# Patient Record
Sex: FEMALE | Race: WHITE | NOT HISPANIC OR LATINO | Employment: PART TIME | URBAN - METROPOLITAN AREA
[De-identification: names, ages, dates, MRNs, and addresses within clinical notes are randomized per-mention and may not be internally consistent; named-entity substitution may affect disease eponyms.]

---

## 2024-10-25 ENCOUNTER — LAB (OUTPATIENT)
Dept: FAMILY MEDICINE CLINIC | Facility: CLINIC | Age: 34
End: 2024-10-25
Payer: COMMERCIAL

## 2024-10-25 ENCOUNTER — OFFICE VISIT (OUTPATIENT)
Dept: FAMILY MEDICINE CLINIC | Facility: CLINIC | Age: 34
End: 2024-10-25
Payer: COMMERCIAL

## 2024-10-25 VITALS
BODY MASS INDEX: 24.1 KG/M2 | HEART RATE: 62 BPM | DIASTOLIC BLOOD PRESSURE: 80 MMHG | SYSTOLIC BLOOD PRESSURE: 110 MMHG | HEIGHT: 69 IN | WEIGHT: 162.7 LBS | RESPIRATION RATE: 18 BRPM | TEMPERATURE: 97.7 F | OXYGEN SATURATION: 98 %

## 2024-10-25 DIAGNOSIS — Z76.89 ENCOUNTER TO ESTABLISH CARE: ICD-10-CM

## 2024-10-25 DIAGNOSIS — Z00.00 ANNUAL PHYSICAL EXAM: ICD-10-CM

## 2024-10-25 DIAGNOSIS — Z00.00 ANNUAL PHYSICAL EXAM: Primary | ICD-10-CM

## 2024-10-25 DIAGNOSIS — Z23 NEED FOR DIPHTHERIA-TETANUS-PERTUSSIS (TDAP) VACCINE: ICD-10-CM

## 2024-10-25 LAB
ALBUMIN SERPL-MCNC: 3.7 G/DL (ref 3.5–5.2)
ALBUMIN/GLOB SERPL: 1.1 G/DL
ALP SERPL-CCNC: 39 U/L (ref 39–117)
ALT SERPL W P-5'-P-CCNC: 9 U/L (ref 1–33)
ANION GAP SERPL CALCULATED.3IONS-SCNC: 5.4 MMOL/L (ref 5–15)
AST SERPL-CCNC: 13 U/L (ref 1–32)
BASOPHILS # BLD AUTO: 0.02 10*3/MM3 (ref 0–0.2)
BASOPHILS NFR BLD AUTO: 0.5 % (ref 0–1.5)
BILIRUB SERPL-MCNC: 0.4 MG/DL (ref 0–1.2)
BILIRUB UR QL STRIP: NEGATIVE
BUN SERPL-MCNC: 7 MG/DL (ref 6–20)
BUN/CREAT SERPL: 7.3 (ref 7–25)
CALCIUM SPEC-SCNC: 8.9 MG/DL (ref 8.6–10.5)
CHLORIDE SERPL-SCNC: 106 MMOL/L (ref 98–107)
CHOLEST SERPL-MCNC: 127 MG/DL (ref 0–200)
CLARITY UR: CLEAR
CO2 SERPL-SCNC: 27.6 MMOL/L (ref 22–29)
COLOR UR: YELLOW
CREAT SERPL-MCNC: 0.96 MG/DL (ref 0.57–1)
DEPRECATED RDW RBC AUTO: 43.6 FL (ref 37–54)
EGFRCR SERPLBLD CKD-EPI 2021: 80.3 ML/MIN/1.73
EOSINOPHIL # BLD AUTO: 0.12 10*3/MM3 (ref 0–0.4)
EOSINOPHIL NFR BLD AUTO: 3 % (ref 0.3–6.2)
ERYTHROCYTE [DISTWIDTH] IN BLOOD BY AUTOMATED COUNT: 11.9 % (ref 12.3–15.4)
GLOBULIN UR ELPH-MCNC: 3.3 GM/DL
GLUCOSE SERPL-MCNC: 93 MG/DL (ref 65–99)
GLUCOSE UR STRIP-MCNC: NEGATIVE MG/DL
HBA1C MFR BLD: 4.8 % (ref 4.8–5.6)
HCT VFR BLD AUTO: 40.8 % (ref 34–46.6)
HDLC SERPL-MCNC: 46 MG/DL (ref 40–60)
HGB BLD-MCNC: 14.1 G/DL (ref 12–15.9)
HGB UR QL STRIP.AUTO: NEGATIVE
HOLD SPECIMEN: NORMAL
IMM GRANULOCYTES # BLD AUTO: 0.01 10*3/MM3 (ref 0–0.05)
IMM GRANULOCYTES NFR BLD AUTO: 0.3 % (ref 0–0.5)
KETONES UR QL STRIP: NEGATIVE
LDLC SERPL CALC-MCNC: 66 MG/DL (ref 0–100)
LDLC/HDLC SERPL: 1.43 {RATIO}
LEUKOCYTE ESTERASE UR QL STRIP.AUTO: NEGATIVE
LYMPHOCYTES # BLD AUTO: 1.05 10*3/MM3 (ref 0.7–3.1)
LYMPHOCYTES NFR BLD AUTO: 26.5 % (ref 19.6–45.3)
MCH RBC QN AUTO: 34.3 PG (ref 26.6–33)
MCHC RBC AUTO-ENTMCNC: 34.6 G/DL (ref 31.5–35.7)
MCV RBC AUTO: 99.3 FL (ref 79–97)
MONOCYTES # BLD AUTO: 0.33 10*3/MM3 (ref 0.1–0.9)
MONOCYTES NFR BLD AUTO: 8.3 % (ref 5–12)
NEUTROPHILS NFR BLD AUTO: 2.43 10*3/MM3 (ref 1.7–7)
NEUTROPHILS NFR BLD AUTO: 61.4 % (ref 42.7–76)
NITRITE UR QL STRIP: NEGATIVE
NRBC BLD AUTO-RTO: 0 /100 WBC (ref 0–0.2)
PH UR STRIP.AUTO: 6.5 [PH] (ref 5–8)
PLATELET # BLD AUTO: 130 10*3/MM3 (ref 140–450)
PMV BLD AUTO: 11.8 FL (ref 6–12)
POTASSIUM SERPL-SCNC: 4.6 MMOL/L (ref 3.5–5.2)
PROT SERPL-MCNC: 7 G/DL (ref 6–8.5)
PROT UR QL STRIP: NEGATIVE
RBC # BLD AUTO: 4.11 10*6/MM3 (ref 3.77–5.28)
SODIUM SERPL-SCNC: 139 MMOL/L (ref 136–145)
SP GR UR STRIP: 1.02 (ref 1–1.03)
TRIGL SERPL-MCNC: 77 MG/DL (ref 0–150)
TSH SERPL DL<=0.05 MIU/L-ACNC: 1.25 UIU/ML (ref 0.27–4.2)
UROBILINOGEN UR QL STRIP: NORMAL
VLDLC SERPL-MCNC: 15 MG/DL (ref 5–40)
WBC NRBC COR # BLD AUTO: 3.96 10*3/MM3 (ref 3.4–10.8)

## 2024-10-25 PROCEDURE — 81003 URINALYSIS AUTO W/O SCOPE: CPT | Performed by: NURSE PRACTITIONER

## 2024-10-25 PROCEDURE — 80050 GENERAL HEALTH PANEL: CPT | Performed by: NURSE PRACTITIONER

## 2024-10-25 PROCEDURE — 83036 HEMOGLOBIN GLYCOSYLATED A1C: CPT | Performed by: NURSE PRACTITIONER

## 2024-10-25 PROCEDURE — 80061 LIPID PANEL: CPT | Performed by: NURSE PRACTITIONER

## 2024-10-25 PROCEDURE — 99385 PREV VISIT NEW AGE 18-39: CPT | Performed by: NURSE PRACTITIONER

## 2024-10-25 NOTE — PROGRESS NOTES
Chief Complaint  Establish Care    Subjective        Chelle Casillas presents to [facility]  History of Present Illness    Patient presents today to establish care and for annual physical exam.  Last PCP with Dr. Mckinley.  Currently works as  at [employer].  , daughter age 13. Enjoys dogs and  volleyball games.  Follows with gynecology; [facility]- last Pap normal August 2024.  Also follows with [facility] in [place].       Review of systems:   Constitutional: Negative for fatigue, fever, chills, weight change, night sweats, and trouble sleeping    Skin: Negative for itching, rash, growth/lesions, dryness, change in hair or nails, lumps, abnormal mole, color change, and moisture    Eyes: Negative for visual disturbance and dry eyes    HEENT: Hx canker sores. Negative for hoarseness, nosebleeds, post nasal drainage, snoring, sneezing, vertigo, sore throat, dry mouth, dental/oral cavity problem, nasal discharge, sinus pain, earache, tinnitus, vertigo, and difficult hearing    Respiratory: Negative for shortness of breath, apnea, cough, sputum, and wheezing. Vaping daily.     Breast: Family hx aunt with breast cancer. Negative for lumps or nipple discharge    Cardiovascular: Negative for chest pain, palpitations, dizziness, fainting, and edema    Gastrointestinal: Negative for nausea, vomiting, heartburn, change in appetite, swallowing problem, abdominal pain, constipation, diarrhea, black, blood, or mucous in stool, and change in stool caliber    Genitourinary: Negative for frequency, urgency, hesitancy, dysuria, incontinence, hematuria, hernia, vaginal discharge, vaginal dryness, itching, abnormal bleeding, dyspareunia, and pelvic pain     Musculoskeletal: Negative for loss of strength, restricted movement, joint pain, joint swelling, and muscle pain    Neurological: Negative for headache, dizziness, numbness, weakness, tingling/paresthesia, memory  "change/loss, loss of consciousness, speech disturbance, tremor, unsteady gait, and restless legs    Hematologic/Lymphatic/Immunologic: Hx easy bruising. Negative for easy bleeding, lymph node enlargement, and recurrent infection     Endocrine: Negative for polyphagia, polydipsia, polyuria, cold intolerance, heat intolerance, weight change, and excessive sweating    Psychiatric: Negative for anxiety, depression, and attention problems       PHQ-9 Depression Screening  Little interest or pleasure in doing things? Not at all   Feeling down, depressed, or hopeless? Not at all   PHQ-2 Total Score 0   Trouble falling or staying asleep, or sleeping too much?     Feeling tired or having little energy?     Poor appetite or overeating?     Feeling bad about yourself - or that you are a failure or have let yourself or your family down?     Trouble concentrating on things, such as reading the newspaper or watching television?     Moving or speaking so slowly that other people could have noticed? Or the opposite - being so fidgety or restless that you have been moving around a lot more than usual?     Thoughts that you would be better off dead, or of hurting yourself in some way?     PHQ-9 Total Score     If you checked off any problems, how difficult have these problems made it for you to do your work, take care of things at home, or get along with other people?              Objective   Vital Signs:  /80 (BP Location: Left arm, Patient Position: Sitting, Cuff Size: Adult)   Pulse 62   Temp 97.7 °F (36.5 °C) (Temporal)   Resp 18   Ht 175.3 cm (69\")   Wt 73.8 kg (162 lb 11.2 oz)   SpO2 98%   BMI 24.03 kg/m²   Estimated body mass index is 24.03 kg/m² as calculated from the following:    Height as of this encounter: 175.3 cm (69\").    Weight as of this encounter: 73.8 kg (162 lb 11.2 oz).    BMI is within normal parameters. No other follow-up for BMI required.      Physical Exam  Vitals and nursing note reviewed. "   Constitutional:       General: She is not in acute distress.     Appearance: She is well-developed, well-groomed and normal weight.      Comments: Pleasant, converses appropriately     HENT:      Head: Normocephalic and atraumatic.      Right Ear: Hearing, tympanic membrane, ear canal and external ear normal.      Left Ear: Hearing, tympanic membrane, ear canal and external ear normal.      Nose: Nose normal.      Mouth/Throat:      Lips: Pink.      Mouth: Mucous membranes are moist.      Pharynx: Oropharynx is clear.   Eyes:      Conjunctiva/sclera: Conjunctivae normal.      Pupils: Pupils are equal, round, and reactive to light.   Neck:      Thyroid: No thyromegaly.   Cardiovascular:      Rate and Rhythm: Normal rate and regular rhythm.      Chest Wall: PMI is not displaced.      Pulses: Normal pulses.      Heart sounds: Normal heart sounds, S1 normal and S2 normal.   Pulmonary:      Effort: Pulmonary effort is normal.      Breath sounds: Normal breath sounds.   Abdominal:      General: Bowel sounds are normal.      Palpations: Abdomen is soft. There is no hepatomegaly or splenomegaly.      Tenderness: There is no abdominal tenderness.   Musculoskeletal:         General: Normal range of motion.      Cervical back: Normal range of motion and neck supple.      Right lower leg: No edema.      Left lower leg: No edema.      Comments: Normal strength bilateral upper and lower extremities.   Lymphadenopathy:      Cervical: No cervical adenopathy.      Upper Body:      Right upper body: No supraclavicular adenopathy.      Left upper body: No supraclavicular adenopathy.   Skin:     General: Skin is warm and dry.      Findings: No rash.   Neurological:      Mental Status: She is alert and oriented to person, place, and time.      Cranial Nerves: Cranial nerves 2-12 are intact.      Motor: Motor function is intact.      Gait: Gait is intact.   Psychiatric:         Attention and Perception: Attention normal.         Mood  and Affect: Mood and affect normal.         Speech: Speech normal.         Behavior: Behavior normal.         Thought Content: Thought content normal.         Judgment: Judgment normal.      Comments: Dressed appropriately.         Result Review :                Assessment and Plan   Diagnoses and all orders for this visit:    1. Annual physical exam (Primary)  Assessment & Plan:  Discussed injury prevention, diet and exercise, safe sexual practices, and screening for common diseases. Encouraged use of sunscreen and seatbelts. Discussed timing of breast cancer screening, cervical cancer screening, colon cancer screening, and review of skin for lesions. Avoidance of tobacco encouraged. Limitation or avoidance of alcohol encouraged. Recommend yearly eye and dental exams. Also discussed monitoring of blood pressure and lipids.       Orders:  -     CBC & Differential; Future  -     Comprehensive Metabolic Panel; Future  -     Hemoglobin A1c; Future  -     Urinalysis With Culture If Indicated -; Future  -     TSH Rfx On Abnormal To Free T4; Future  -     Lipid Panel; Future    2. Encounter to establish care  -     CBC & Differential; Future  -     Comprehensive Metabolic Panel; Future  -     Hemoglobin A1c; Future  -     Urinalysis With Culture If Indicated -; Future  -     TSH Rfx On Abnormal To Free T4; Future  -     Lipid Panel; Future    3. Need for diphtheria-tetanus-pertussis (Tdap) vaccine  Comments:  Education provided at checkout.             Follow Up   Return in about 1 year (around 10/25/2025) for Annual physical.  Patient was given instructions and counseling regarding her condition or for health maintenance advice. Please see specific information pulled into the AVS if appropriate.

## 2024-10-25 NOTE — ASSESSMENT & PLAN NOTE
Discussed injury prevention, diet and exercise, safe sexual practices, and screening for common diseases. Encouraged use of sunscreen and seatbelts. Discussed timing of breast cancer screening, cervical cancer screening, colon cancer screening, and review of skin for lesions. Avoidance of tobacco encouraged. Limitation or avoidance of alcohol encouraged. Recommend yearly eye and dental exams. Also discussed monitoring of blood pressure and lipids.

## 2024-10-26 DIAGNOSIS — R71.8 ELEVATED MCV: Primary | ICD-10-CM

## 2024-10-31 ENCOUNTER — LAB (OUTPATIENT)
Dept: FAMILY MEDICINE CLINIC | Facility: CLINIC | Age: 34
End: 2024-10-31
Payer: COMMERCIAL

## 2024-10-31 ENCOUNTER — OFFICE VISIT (OUTPATIENT)
Dept: FAMILY MEDICINE CLINIC | Facility: CLINIC | Age: 34
End: 2024-10-31
Payer: COMMERCIAL

## 2024-10-31 VITALS
DIASTOLIC BLOOD PRESSURE: 60 MMHG | BODY MASS INDEX: 23.98 KG/M2 | OXYGEN SATURATION: 97 % | RESPIRATION RATE: 18 BRPM | HEART RATE: 68 BPM | TEMPERATURE: 97.7 F | HEIGHT: 69 IN | SYSTOLIC BLOOD PRESSURE: 106 MMHG | WEIGHT: 161.9 LBS

## 2024-10-31 DIAGNOSIS — R71.8 ELEVATED MCV: ICD-10-CM

## 2024-10-31 DIAGNOSIS — J02.9 PHARYNGITIS, UNSPECIFIED ETIOLOGY: ICD-10-CM

## 2024-10-31 DIAGNOSIS — B37.0 ORAL THRUSH: ICD-10-CM

## 2024-10-31 DIAGNOSIS — K13.70 MOUTH PROBLEM: Primary | ICD-10-CM

## 2024-10-31 PROCEDURE — 82607 VITAMIN B-12: CPT | Performed by: NURSE PRACTITIONER

## 2024-10-31 PROCEDURE — 87205 SMEAR GRAM STAIN: CPT | Performed by: NURSE PRACTITIONER

## 2024-10-31 PROCEDURE — 99213 OFFICE O/P EST LOW 20 MIN: CPT | Performed by: NURSE PRACTITIONER

## 2024-10-31 PROCEDURE — 82746 ASSAY OF FOLIC ACID SERUM: CPT | Performed by: NURSE PRACTITIONER

## 2024-10-31 PROCEDURE — 87070 CULTURE OTHR SPECIMN AEROBIC: CPT | Performed by: NURSE PRACTITIONER

## 2024-10-31 RX ORDER — NYSTATIN 100000 [USP'U]/ML
500000 SUSPENSION ORAL 4 TIMES DAILY
Qty: 120 ML | Refills: 0 | Status: SHIPPED | OUTPATIENT
Start: 2024-10-31 | End: 2024-11-03 | Stop reason: SDUPTHER

## 2024-10-31 NOTE — PROGRESS NOTES
"Chief Complaint  sores in mouth (Started on Friday, with a weird taste, then the sores started on Monday. This is the 4th time this has happened. Her dentist said for her to come here. )    Subjective        Chelle Casillas presents to [facility]  History of Present Illness    Mouth lesion/sore:   Onset began 7 days ago. Severity is moderate. Status is worse. Frequency is constant. Location is under tongue. Quality is white and red patches. Context includes positive history of 4 other similar episodes.   Negative for cheek biting, chewing tobacco use, excessive alcohol use, inhaled corticosteroid use, oral corticosteroid use, orthodontic appliance, recent antibiotic use, trauma, viral illness. Aggravated by acidic foods. Relieved by Magic mouthwash in the past.  Associated symptoms include intermittent sore throat, poor dietary intake, metal taste and pain.  Pertinent negatives include ammonia like odor, bleeding from the site, bruxism, dysphagia, erythema of lips, fever, food allergies, malaise, odynophagia, otalgia, prodromal sensation, diminished salivation, profuse salivation, skin disorder, strawberry tongue.  Comments: Needs B12 and folate labs completed following initial visit.        Objective   Vital Signs:  /60 (BP Location: Left arm, Patient Position: Sitting, Cuff Size: Adult)   Pulse 68   Temp 97.7 °F (36.5 °C) (Temporal)   Resp 18   Ht 175.3 cm (69\")   Wt 73.4 kg (161 lb 14.4 oz)   SpO2 97%   BMI 23.91 kg/m²   Estimated body mass index is 23.91 kg/m² as calculated from the following:    Height as of this encounter: 175.3 cm (69\").    Weight as of this encounter: 73.4 kg (161 lb 14.4 oz).    BMI is within normal parameters. No other follow-up for BMI required.      Physical Exam  Constitutional:       General: She is not in acute distress.  HENT:      Head: Normocephalic and atraumatic.      Right Ear: Tympanic membrane, ear canal and external ear normal. "      Left Ear: Tympanic membrane, ear canal and external ear normal.      Nose: Nose normal. No congestion or rhinorrhea.      Right Sinus: No maxillary sinus tenderness or frontal sinus tenderness.      Left Sinus: No maxillary sinus tenderness or frontal sinus tenderness.      Mouth/Throat:      Mouth: Mucous membranes are moist.      Tongue: No lesions.      Comments: Few faint areas of gingival swelling.  Mild pharyngeal erythema.  White patches under the tongue.  Eyes:      Conjunctiva/sclera: Conjunctivae normal.   Cardiovascular:      Rate and Rhythm: Normal rate and regular rhythm.      Chest Wall: PMI is not displaced.      Heart sounds: Normal heart sounds, S1 normal and S2 normal.   Pulmonary:      Effort: Pulmonary effort is normal.      Breath sounds: Normal breath sounds.      Comments: Cough absent.   Musculoskeletal:      Cervical back: Neck supple.   Lymphadenopathy:      Cervical: No cervical adenopathy.   Skin:     General: Skin is warm and dry.      Findings: No rash.   Neurological:      Mental Status: She is alert and oriented to person, place, and time.      Gait: Gait is intact.   Psychiatric:         Mood and Affect: Mood normal.         Thought Content: Thought content normal.        Result Review :    CMP          10/25/2024    11:04   CMP   Glucose 93    BUN 7    Creatinine 0.96    EGFR 80.3    Sodium 139    Potassium 4.6    Chloride 106    Calcium 8.9    Total Protein 7.0    Albumin 3.7    Globulin 3.3    Total Bilirubin 0.4    Alkaline Phosphatase 39    AST (SGOT) 13    ALT (SGPT) 9    Albumin/Globulin Ratio 1.1    BUN/Creatinine Ratio 7.3    Anion Gap 5.4      CBC w/diff          10/25/2024    11:04   CBC w/Diff   WBC 3.96    RBC 4.11    Hemoglobin 14.1    Hematocrit 40.8    MCV 99.3    MCH 34.3    MCHC 34.6    RDW 11.9    Platelets 130    Neutrophil Rel % 61.4    Immature Granulocyte Rel % 0.3    Lymphocyte Rel % 26.5    Monocyte Rel % 8.3    Eosinophil Rel % 3.0    Basophil Rel % 0.5       Lipid Panel          10/25/2024    11:04   Lipid Panel   Total Cholesterol 127    Triglycerides 77    HDL Cholesterol 46    VLDL Cholesterol 15    LDL Cholesterol  66    LDL/HDL Ratio 1.43      TSH          10/25/2024    11:04   TSH   TSH 1.250      Most Recent A1C          10/25/2024    11:04   HGBA1C Most Recent   Hemoglobin A1C 4.80                Assessment and Plan   Diagnoses and all orders for this visit:    1. Mouth problem (Primary)  -     nystatin (MYCOSTATIN) 100,000 unit/mL suspension; Swish and swallow 5 mL 4 (Four) Times a Day.  Dispense: 120 mL; Refill: 0  -     Throat / Upper Respiratory Culture - Swab, Throat    2. Oral thrush  -     nystatin (MYCOSTATIN) 100,000 unit/mL suspension; Swish and swallow 5 mL 4 (Four) Times a Day.  Dispense: 120 mL; Refill: 0    3. Pharyngitis, unspecified etiology  -     Throat / Upper Respiratory Culture - Swab, Throat    Treat for thrush.  Will send throat culture considering past episodes.  Complete follow-up labs today.         Follow Up   Return if symptoms worsen or fail to improve.  Patient was given instructions and counseling regarding her condition or for health maintenance advice. Please see specific information pulled into the AVS if appropriate.             Answers submitted by the patient for this visit:  Other (Submitted on 10/30/2024)  Please describe your symptoms.: Mouth sores and white oatches under tongue. Slight sore throat.  Have you had these symptoms before?: Yes  How long have you been having these symptoms?: 1-2 weeks  Primary Reason for Visit (Submitted on 10/30/2024)  What is the primary reason for your visit?: Problem Not Listed

## 2024-11-01 LAB
FOLATE SERPL-MCNC: 8.99 NG/ML (ref 4.78–24.2)
VIT B12 BLD-MCNC: 484 PG/ML (ref 211–946)

## 2024-11-02 LAB
BACTERIA SPEC AEROBE CULT: NORMAL
GRAM STN SPEC: NORMAL

## 2024-11-03 DIAGNOSIS — B37.0 ORAL THRUSH: ICD-10-CM

## 2024-11-03 DIAGNOSIS — K13.70 MOUTH PROBLEM: ICD-10-CM

## 2024-11-03 RX ORDER — NYSTATIN 100000 [USP'U]/ML
500000 SUSPENSION ORAL 4 TIMES DAILY
Qty: 120 ML | Refills: 0 | Status: SHIPPED | OUTPATIENT
Start: 2024-11-03

## 2024-11-11 DIAGNOSIS — K13.70 MOUTH PROBLEM: Primary | ICD-10-CM

## 2024-11-11 RX ORDER — ACYCLOVIR 400 MG/1
400 TABLET ORAL 3 TIMES DAILY
Qty: 30 TABLET | Refills: 0 | Status: SHIPPED | OUTPATIENT
Start: 2024-11-11

## 2025-01-02 ENCOUNTER — OFFICE VISIT (OUTPATIENT)
Dept: FAMILY MEDICINE CLINIC | Facility: CLINIC | Age: 35
End: 2025-01-02
Payer: COMMERCIAL

## 2025-01-02 VITALS
BODY MASS INDEX: 24.14 KG/M2 | DIASTOLIC BLOOD PRESSURE: 65 MMHG | TEMPERATURE: 97.9 F | SYSTOLIC BLOOD PRESSURE: 96 MMHG | RESPIRATION RATE: 18 BRPM | OXYGEN SATURATION: 96 % | HEART RATE: 87 BPM | WEIGHT: 163 LBS | HEIGHT: 69 IN

## 2025-01-02 DIAGNOSIS — R05.9 COUGH IN ADULT: ICD-10-CM

## 2025-01-02 DIAGNOSIS — J02.9 SORE THROAT: Primary | ICD-10-CM

## 2025-01-02 DIAGNOSIS — J10.1 INFLUENZA A: ICD-10-CM

## 2025-01-02 LAB
EXPIRATION DATE: ABNORMAL
EXPIRATION DATE: NORMAL
FLUAV AG UPPER RESP QL IA.RAPID: DETECTED
FLUBV AG UPPER RESP QL IA.RAPID: NOT DETECTED
INTERNAL CONTROL: ABNORMAL
INTERNAL CONTROL: NORMAL
Lab: ABNORMAL
Lab: NORMAL
S PYO AG THROAT QL: NEGATIVE
SARS-COV-2 AG UPPER RESP QL IA.RAPID: NOT DETECTED

## 2025-01-02 PROCEDURE — 87880 STREP A ASSAY W/OPTIC: CPT | Performed by: FAMILY MEDICINE

## 2025-01-02 PROCEDURE — 99214 OFFICE O/P EST MOD 30 MIN: CPT | Performed by: FAMILY MEDICINE

## 2025-01-02 PROCEDURE — 87428 SARSCOV & INF VIR A&B AG IA: CPT | Performed by: FAMILY MEDICINE

## 2025-01-02 RX ORDER — OSELTAMIVIR PHOSPHATE 75 MG/1
75 CAPSULE ORAL 2 TIMES DAILY
Qty: 10 CAPSULE | Refills: 0 | Status: SHIPPED | OUTPATIENT
Start: 2025-01-02 | End: 2025-01-07

## 2025-01-02 RX ORDER — BENZONATATE 200 MG/1
200 CAPSULE ORAL 3 TIMES DAILY PRN
Qty: 20 CAPSULE | Refills: 0 | Status: SHIPPED | OUTPATIENT
Start: 2025-01-02

## 2025-01-02 NOTE — PROGRESS NOTES
Subjective   Chelle Casillas is a 34 y.o. female.     History of Present Illness     History of Present Illness  The patient is a 34-year-old female who presents with complaints of sore throat, cough, and congestion. Her symptoms began on [place] day, initially presenting as mild drainage and a scratchy throat. Over the past 3 days, her condition has worsened, with severe throat pain reported on Monday. By Wednesday morning, she developed a fever of 102.1 degrees and a dry cough, which has since evolved into a productive cough. Despite taking Mucinex, she has been unable to expectorate. She has been experiencing fatigue, sleeping more, and decreased appetite.  she has been managing her fever with Tylenol and ibuprofen. This morning, she had a fever. She also reports shortness of breath, attributing these symptoms to nasal and throat congestion. She reports no dizziness, stomach pain, nausea, vomiting, or diarrhea. She has been maintaining hydration.      The following portions of the patient's history were reviewed and updated as appropriate: past medical history, past social history, past surgical history and problem list.    Review of Systems   Constitutional:  Positive for activity change, appetite change, fatigue and fever.   HENT:  Positive for congestion, postnasal drip, sinus pressure, sore throat and swollen glands. Negative for ear pain.    Respiratory:  Positive for cough and shortness of breath. Negative for wheezing.    Cardiovascular:  Negative for chest pain.   Gastrointestinal:  Negative for abdominal pain, nausea and vomiting.   Neurological:  Negative for headache.       Results  Laboratory Studies  Positive for influenza A.  Negative for strep and COVID-19 test.    Objective   Physical Exam  Vitals reviewed.   Constitutional:       General: She is not in acute distress.     Appearance: She is well-developed.   HENT:      Right Ear: Tympanic membrane, ear canal and external ear normal.      Left  Ear: Tympanic membrane, ear canal and external ear normal.      Nose: Congestion and rhinorrhea present.      Mouth/Throat:      Pharynx: Posterior oropharyngeal erythema present.   Eyes:      Conjunctiva/sclera: Conjunctivae normal.   Neck:      Thyroid: No thyromegaly.   Cardiovascular:      Heart sounds: Normal heart sounds.   Pulmonary:      Effort: Pulmonary effort is normal.      Breath sounds: Normal breath sounds. No wheezing.   Abdominal:      Tenderness: There is no abdominal tenderness.   Neurological:      Mental Status: She is alert and oriented to person, place, and time.         Vitals:    01/02/25 1048   BP: 96/65   Pulse: 87   Resp: 18   Temp: 97.9 °F (36.6 °C)   SpO2: 96%     Current Outpatient Medications on File Prior to Visit   Medication Sig Dispense Refill    acyclovir (ZOVIRAX) 400 MG tablet Take 1 tablet by mouth 3 (Three) Times a Day. 30 tablet 0    nystatin (MYCOSTATIN) 100,000 unit/mL suspension Swish and swallow 5 mL 4 (Four) Times a Day. 120 mL 0     No current facility-administered medications on file prior to visit.         Assessment & Plan   Problems Addressed this Visit       Sore throat - Primary    Relevant Orders    POCT SARS-CoV-2 + Flu Antigen SHRUTI (Completed)    POC Rapid Strep A (Completed)    Cough in adult    Relevant Medications    benzonatate (TESSALON) 200 MG capsule    Other Relevant Orders    POCT SARS-CoV-2 + Flu Antigen SHRUTI (Completed)    POC Rapid Strep A (Completed)    Influenza A    Relevant Medications    oseltamivir (Tamiflu) 75 MG capsule     Diagnoses         Codes Comments    Sore throat    -  Primary ICD-10-CM: J02.9  ICD-9-CM: 462     Cough in adult     ICD-10-CM: R05.9  ICD-9-CM: 786.2     Influenza A     ICD-10-CM: J10.1  ICD-9-CM: 487.1             Assessment & Plan  1. Influenza.  She tested positive for influenza. Her symptoms include sore throat, cough, congestion, fever, and fatigue.  A prescription for Tamiflu 75 mg, to be taken twice daily for 5  days, has been sent to her [facility] pharmacy. She is advised to stay hydrated and monitor her symptoms. A work note has been provided, excusing her from work today and tomorrow. She may return to work on Saturday if she is fever-free.    2. Cough.  A prescription for Tessalon Perles, to be taken every 8 hours as needed for the cough.  Encourage fluids.    3.Sore throat.  Discussed symptom management with Tylenol/ibuprofen and salt water gargles.           Patient or patient representative verbalized consent for the use of Ambient Listening during the visit with  José Miguel Hillman MD for chart documentation. 1/2/2025  11:03 EST

## 2025-01-09 ENCOUNTER — OFFICE VISIT (OUTPATIENT)
Dept: FAMILY MEDICINE CLINIC | Facility: CLINIC | Age: 35
End: 2025-01-09
Payer: COMMERCIAL

## 2025-01-09 VITALS
BODY MASS INDEX: 23.89 KG/M2 | HEIGHT: 69 IN | HEART RATE: 81 BPM | DIASTOLIC BLOOD PRESSURE: 71 MMHG | SYSTOLIC BLOOD PRESSURE: 110 MMHG | WEIGHT: 161.3 LBS | RESPIRATION RATE: 14 BRPM | OXYGEN SATURATION: 98 % | TEMPERATURE: 97.7 F

## 2025-01-09 DIAGNOSIS — B30.9 ACUTE VIRAL CONJUNCTIVITIS OF BOTH EYES: Primary | ICD-10-CM

## 2025-01-09 PROCEDURE — 99213 OFFICE O/P EST LOW 20 MIN: CPT | Performed by: FAMILY MEDICINE

## 2025-01-09 RX ORDER — TOBRAMYCIN 3 MG/ML
1 SOLUTION/ DROPS OPHTHALMIC
Qty: 5 ML | Refills: 0 | Status: SHIPPED | OUTPATIENT
Start: 2025-01-09

## 2025-01-09 NOTE — PROGRESS NOTES
Subjective   Chief Complaint   Patient presents with    Conjunctivitis     Bilateral, worse in right eye.     Chelle Casillas is a 34 y.o. female.     Patient Care Team:  Deja Hatfield APRN as PCP - General (Nurse Practitioner)  Rosi Choudhury APRN (Family Medicine)    History of Present Illness  She is coming in today due to eye concerns.  She reports that for the last 4 days or so she has been having some redness and irritation in her eyes, she wakes up in the morning and her eyes are matted.  The right eye seems to be somehow worse.  No visual problems.  She was exposed to some children recently with pinkeye.  She has contacts, but she does not use them regularly and has not used them for a while.  She is now wearing glasses.       The following portions of the patient's history were reviewed and updated as appropriate: allergies, current medications, past family history, past medical history, past social history, past surgical history, and problem list.  History reviewed. No pertinent past medical history.  Past Surgical History:   Procedure Laterality Date     SECTION      EYE SURGERY      EYE SURGERY      age 4- strabismus     The patient has a family history of  Family History   Problem Relation Age of Onset    Diabetes type II Father     Diabetes type I Daughter     Rectal cancer Maternal Aunt     Colon cancer Paternal Aunt     Lung cancer Maternal Grandmother      Social History     Socioeconomic History    Marital status:    Tobacco Use    Smoking status: Never     Passive exposure: Never    Smokeless tobacco: Never   Vaping Use    Vaping status: Every Day    Substances: Nicotine    Devices: Disposable   Substance and Sexual Activity    Alcohol use: Not Currently    Drug use: Never    Sexual activity: Yes     Partners: Male       Review of Systems   Constitutional:  Negative for chills and fever.   Eyes:  Positive for discharge and redness. Negative for blurred vision, double  "vision, itching and visual disturbance.   Skin:  Negative for rash.   Neurological:  Negative for headache.     Visit Vitals  /71 (BP Location: Left arm, Patient Position: Sitting, Cuff Size: Adult)   Pulse 81   Temp 97.7 °F (36.5 °C) (Infrared)   Resp 14   Ht 175.3 cm (69.02\")   Wt 73.2 kg (161 lb 4.8 oz)   SpO2 98%   BMI 23.81 kg/m²       BMI is within normal parameters. No other follow-up for BMI required.      Current Outpatient Medications:     benzonatate (TESSALON) 200 MG capsule, Take 1 capsule by mouth 3 (Three) Times a Day As Needed for Cough., Disp: 20 capsule, Rfl: 0    tobramycin (Tobrex) 0.3 % solution ophthalmic solution, Administer 1 drop to both eyes Every 4 (Four) Hours., Disp: 5 mL, Rfl: 0    Objective   Physical Exam  Constitutional:       General: She is not in acute distress.     Appearance: Normal appearance. She is well-developed. She is not ill-appearing or diaphoretic.      Comments: Patient is in no distress, patient has normal voice and speech.  Normal respiratory effort.   HENT:      Head: Normocephalic and atraumatic.   Eyes:      Comments: Both conjunctivitis are slightly inflamed, there is some crusting noted on eye lids.   Pulmonary:      Effort: Pulmonary effort is normal.   Musculoskeletal:      Cervical back: Normal range of motion and neck supple.   Neurological:      General: No focal deficit present.      Mental Status: She is alert and oriented to person, place, and time. Mental status is at baseline.   Psychiatric:         Mood and Affect: Mood normal.         Assessment & Plan   Diagnoses and all orders for this visit:    1. Acute viral conjunctivitis of both eyes (Primary)  -     tobramycin (Tobrex) 0.3 % solution ophthalmic solution; Administer 1 drop to both eyes Every 4 (Four) Hours.  Dispense: 5 mL; Refill: 0        I will be starting her on tobramycin eyedrops.  Patient was instructed on how to use these and she will continue to monitor the symptoms and either " contact us back if no improvement or see her eye doctor.      Return if symptoms worsen or fail to improve, for Recheck.    Requested Prescriptions     Signed Prescriptions Disp Refills    tobramycin (Tobrex) 0.3 % solution ophthalmic solution 5 mL 0     Sig: Administer 1 drop to both eyes Every 4 (Four) Hours.       Roxane Juarez MD  01/09/2025  09:03 EST

## 2025-03-04 ENCOUNTER — OFFICE VISIT (OUTPATIENT)
Dept: FAMILY MEDICINE CLINIC | Facility: CLINIC | Age: 35
End: 2025-03-04
Payer: COMMERCIAL

## 2025-03-04 ENCOUNTER — TELEPHONE (OUTPATIENT)
Dept: FAMILY MEDICINE CLINIC | Facility: CLINIC | Age: 35
End: 2025-03-04

## 2025-03-04 VITALS
TEMPERATURE: 97.8 F | HEIGHT: 69 IN | BODY MASS INDEX: 23.68 KG/M2 | RESPIRATION RATE: 18 BRPM | OXYGEN SATURATION: 99 % | HEART RATE: 73 BPM | WEIGHT: 159.9 LBS | DIASTOLIC BLOOD PRESSURE: 66 MMHG | SYSTOLIC BLOOD PRESSURE: 106 MMHG

## 2025-03-04 DIAGNOSIS — M25.562 ACUTE PAIN OF LEFT KNEE: Primary | ICD-10-CM

## 2025-03-04 PROCEDURE — 99213 OFFICE O/P EST LOW 20 MIN: CPT | Performed by: NURSE PRACTITIONER

## 2025-03-04 NOTE — TELEPHONE ENCOUNTER
Will you please call [facility] and ask for office note from fall 2024?  Patient was seen for a mouth problem.

## 2025-03-04 NOTE — PROGRESS NOTES
"Chief Complaint  Knee Pain (Left knee pain, started last Tuesday, was running at the gym. Been taking ibuphrofen and ice and it isn't helping. )    Subjective        Chelle Casillas presents to [facility]  History of Present Illness    Knee pain:  Onset of pain began 7 days ago. Severity of pain is moderate. Frequency is constant. Status is worsening. Location is left anterior knee. There is no radiation. The quality of the pain is aching.  Context includes walking/jogging on treadmill at gym.  No known injury.  Aggravating factors include descending stairs, standing, and walking.   Relieving factors include ice, ibuprofen, and aleve.  Associated symptoms include decreased mobility, weakness, tenderness, swelling, and limping.  Pertinent negatives include crepitus, fever, bruising, difficulty initiating sleep, joint instability, locking, nocturnal awakening, numbness, popping, spasms, tingling in legs.       Objective   Vital Signs:  /66 (BP Location: Left arm, Patient Position: Sitting, Cuff Size: Adult)   Pulse 73   Temp 97.8 °F (36.6 °C) (Temporal)   Resp 18   Ht 175.3 cm (69\")   Wt 72.5 kg (159 lb 14.4 oz)   SpO2 99%   BMI 23.61 kg/m²   Estimated body mass index is 23.61 kg/m² as calculated from the following:    Height as of this encounter: 175.3 cm (69\").    Weight as of this encounter: 72.5 kg (159 lb 14.4 oz).    BMI is within normal parameters. No other follow-up for BMI required.      Physical Exam  Constitutional:       General: She is not in acute distress.  Cardiovascular:      Rate and Rhythm: Normal rate and regular rhythm.      Heart sounds: Normal heart sounds, S1 normal and S2 normal.   Pulmonary:      Effort: Pulmonary effort is normal.      Breath sounds: Normal breath sounds and air entry.   Musculoskeletal:      Left knee: Swelling present. No erythema, ecchymosis or bony tenderness. Decreased range of motion. Tenderness present over the medial " joint line. Normal alignment. Normal pulse.      Right lower leg: No edema.      Left lower leg: Edema present.   Skin:     General: Skin is warm and dry.   Neurological:      Mental Status: She is alert and oriented to person, place, and time.      Gait: Gait is intact.   Psychiatric:         Mood and Affect: Mood normal.         Thought Content: Thought content normal.         Judgment: Judgment normal.        Result Review :    CMP          10/25/2024    11:04   CMP   Glucose 93    BUN 7    Creatinine 0.96    EGFR 80.3    Sodium 139    Potassium 4.6    Chloride 106    Calcium 8.9    Total Protein 7.0    Albumin 3.7    Globulin 3.3    Total Bilirubin 0.4    Alkaline Phosphatase 39    AST (SGOT) 13    ALT (SGPT) 9    Albumin/Globulin Ratio 1.1    BUN/Creatinine Ratio 7.3    Anion Gap 5.4      CBC          10/25/2024    11:04   CBC   WBC 3.96    RBC 4.11    Hemoglobin 14.1    Hematocrit 40.8    MCV 99.3    MCH 34.3    MCHC 34.6    RDW 11.9    Platelets 130                Assessment and Plan   Diagnoses and all orders for this visit:    1. Acute pain of left knee (Primary)  -     Ambulatory Referral to Orthopedic Surgery    Recommended ice for 20 to 30 minutes 2-3 times a day, neoprene compression sleeve, and follow-up with orthopedic provider.         Follow Up   Return if symptoms worsen or fail to improve.  Patient was given instructions and counseling regarding her condition or for health maintenance advice. Please see specific information pulled into the AVS if appropriate.

## 2025-03-13 ENCOUNTER — OFFICE VISIT (OUTPATIENT)
Dept: ORTHOPEDIC SURGERY | Facility: CLINIC | Age: 35
End: 2025-03-13
Payer: COMMERCIAL

## 2025-03-13 VITALS
BODY MASS INDEX: 23.55 KG/M2 | HEIGHT: 69 IN | RESPIRATION RATE: 18 BRPM | OXYGEN SATURATION: 98 % | WEIGHT: 159 LBS | HEART RATE: 71 BPM

## 2025-03-13 DIAGNOSIS — M25.562 ACUTE PAIN OF LEFT KNEE: Primary | ICD-10-CM

## 2025-03-13 DIAGNOSIS — M70.52 PES ANSERINUS BURSITIS OF LEFT KNEE: ICD-10-CM

## 2025-03-13 PROCEDURE — 99203 OFFICE O/P NEW LOW 30 MIN: CPT | Performed by: FAMILY MEDICINE

## 2025-03-13 NOTE — PROGRESS NOTES
Primary Care Sports Medicine Office Visit Note    Patient ID: Chelle Casillas is a 34 y.o. female.    Chief Complaint:  Chief Complaint   Patient presents with    Left Knee - Pain, Initial Evaluation     No specific injury, was running  on treadmill  Pain: 4/10       History of Present Illness  The patient presents for evaluation of left knee pain.    She reports an inability to walk due to the pain, which she first experienced 3 weeks ago while transitioning from walking to running on a treadmill. The pain has been progressively worsening, with swelling noted after prolonged use of the knee. A bruise appeared after a week, and the pain has been fluctuating since then. She has not introduced new footwear or exercise routines but admits to more intense activity. She reports no instability in the knee but experienced difficulty descending stairs last week, a symptom that has improved this week. Kneeling is particularly challenging. After a period of rest, the pain subsided, but a 10-hour workday yesterday exacerbated it. She has no prior history of knee issues. She has abstained from running for the past week. Despite attempts at self-management with ice, ibuprofen, and rest, the pain has persisted.    MEDICATIONS  Current: ibuprofen       No past medical history on file.    Past Surgical History:   Procedure Laterality Date     SECTION      EYE SURGERY      EYE SURGERY      age 4- strabismus       Family History   Problem Relation Age of Onset    Diabetes type II Father     Diabetes Father         Type 2    Diabetes type I Daughter     Rectal cancer Maternal Aunt     Colon cancer Paternal Aunt     Lung cancer Maternal Grandmother     Cancer Maternal Grandmother     Hyperlipidemia Mother     Anxiety disorder Sister     Mental illness Sister     Cancer Maternal Aunt     Cancer Paternal Aunt      Social History     Occupational History    Not on file   Tobacco Use    Smoking status: Passive Smoke Exposure -  "Never Smoker     Passive exposure: Never    Smokeless tobacco: Never    Tobacco comments:     Used to smoke cigarettes, now vape occasionally   Vaping Use    Vaping status: Every Day    Substances: Nicotine    Devices: Disposable   Substance and Sexual Activity    Alcohol use: Not Currently     Comment: Havent drank since July 2021    Drug use: Not Currently     Comment: Havent used since July 2021    Sexual activity: Yes     Partners: Male     Birth control/protection: Birth control pill        Review of Systems:  Review of Systems   Constitutional:  Negative for activity change, fatigue and fever.   Musculoskeletal:  Positive for arthralgias.   Skin:  Negative for color change and rash.   Neurological:  Negative for numbness.     Objective:  Physical Exam  Pulse 71   Resp 18   Ht 175.3 cm (69\")   Wt 72.1 kg (159 lb)   SpO2 98%   BMI 23.48 kg/m²   Vitals and nursing note reviewed.   Constitutional:       General: she  is not in acute distress.     Appearance: she is well-developed. she is not diaphoretic.   HENT:      Head: Normocephalic and atraumatic.   Eyes:      Conjunctiva/sclera: Conjunctivae normal.   Pulmonary:      Effort: Pulmonary effort is normal. No respiratory distress.   Skin:     General: Skin is warm.      Capillary Refill: Capillary refill takes less than 2 seconds.   Neurological:      Mental Status: she is alert.     Ortho Exam:  Physical Exam  Left knee examination reveals a full range of motion from 0 to 130 degrees with negative varus valgus stress testing, negative Lachman, negative anterior posterior drawers.  Medial lateral Jerry's are negative.  There is considerable tenderness and pain to palpation of the Pez anserine bursa with very mild fluctuance of less than 1 cm in diameter palpable.  No erythema or warmth.    Results  Imaging  Three-view x-ray of the left knee shows no acute bony abnormality.    Diagnoses and all orders for this visit:    1. Acute pain of left knee " "(Primary)  -     XR Knee 3 View Left      Assessment & Plan  1. Pes anserine bursitis.  The patient reports knee pain that began 3 weeks ago after running on a treadmill. Symptoms include swelling, bruising, and pain exacerbated by prolonged standing or walking. Physical examination and a 3-view x-ray of the left knee revealed no acute bony abnormalities, confirming the diagnosis of pes anserine bursitis. She was advised to continue running if comfortable but to be aware that it may prolong irritation. A stretching and strengthening program was recommended to be performed at least 2 to 3 days per week. She was advised to use Voltaren cream up to 3 times daily, especially before running. Over-the-counter Tylenol can also be used for pain management. If symptoms persist or worsen, she should contact the office for further evaluation, which may include a steroid injection.    Oscar MARES. \"Chance\" Sammy RASHEED DO, CAQSM  03/13/25  10:05 EDT    Disclaimer: Please note that areas of this note were completed with computer voice recognition software.  Quite often unanticipated grammatical, syntax, homophones, and other interpretive errors are inadvertently transcribed by the computer software. Please excuse any errors that have escaped final proofreading.    Patient or patient representative verbalized consent for the use of Ambient Listening during the visit with  Oscar Christianson II, DO for chart documentation. 10:05 EDT 03/13/25   "

## 2025-06-17 ENCOUNTER — TRANSCRIBE ORDERS (OUTPATIENT)
Dept: LAB | Facility: HOSPITAL | Age: 35
End: 2025-06-17
Payer: COMMERCIAL

## 2025-06-17 ENCOUNTER — LAB (OUTPATIENT)
Dept: LAB | Facility: HOSPITAL | Age: 35
End: 2025-06-17
Payer: COMMERCIAL

## 2025-06-17 DIAGNOSIS — D89.89 RADIATION CHIMERA: Primary | ICD-10-CM

## 2025-06-17 DIAGNOSIS — M35.00 SICCA SYNDROME: ICD-10-CM

## 2025-06-17 DIAGNOSIS — E06.9 THYROIDITIS, UNSPECIFIED: ICD-10-CM

## 2025-06-17 DIAGNOSIS — M31.30 WEGENER'S GRANULOMATOSIS WITHOUT RENAL INVOLVEMENT: ICD-10-CM

## 2025-06-17 DIAGNOSIS — M31.6 GIANT CELL ARTERITIS: ICD-10-CM

## 2025-06-17 DIAGNOSIS — D89.89 RADIATION CHIMERA: ICD-10-CM

## 2025-06-17 LAB
CHROMATIN AB SERPL-ACNC: <10 IU/ML (ref 0–14)
CRP SERPL-MCNC: <0.3 MG/DL (ref 0–0.5)
ERYTHROCYTE [SEDIMENTATION RATE] IN BLOOD: <1 MM/HR (ref 0–20)

## 2025-06-17 PROCEDURE — 83516 IMMUNOASSAY NONANTIBODY: CPT

## 2025-06-17 PROCEDURE — 86235 NUCLEAR ANTIGEN ANTIBODY: CPT

## 2025-06-17 PROCEDURE — 86140 C-REACTIVE PROTEIN: CPT

## 2025-06-17 PROCEDURE — 86147 CARDIOLIPIN ANTIBODY EA IG: CPT

## 2025-06-17 PROCEDURE — 86037 ANCA TITER EACH ANTIBODY: CPT

## 2025-06-17 PROCEDURE — 86431 RHEUMATOID FACTOR QUANT: CPT

## 2025-06-17 PROCEDURE — 86038 ANTINUCLEAR ANTIBODIES: CPT

## 2025-06-17 PROCEDURE — 85652 RBC SED RATE AUTOMATED: CPT

## 2025-06-17 PROCEDURE — 36415 COLL VENOUS BLD VENIPUNCTURE: CPT

## 2025-06-18 LAB
ANA SER QL IF: NEGATIVE
CARDIOLIPIN IGA SER IA-ACNC: <9 APL U/ML (ref 0–11)
CARDIOLIPIN IGG SER IA-ACNC: <9 GPL U/ML (ref 0–14)
CARDIOLIPIN IGM SER IA-ACNC: <9 MPL U/ML (ref 0–12)
ENA SS-A AB SER-ACNC: <0.2 AI (ref 0–0.9)
ENA SS-B AB SER-ACNC: <0.2 AI (ref 0–0.9)

## 2025-06-19 LAB
C-ANCA TITR SER IF: NORMAL TITER
MYELOPEROXIDASE AB SER IA-ACNC: <0.2 UNITS (ref 0–0.9)
P-ANCA ATYPICAL TITR SER IF: NORMAL TITER
P-ANCA TITR SER IF: NORMAL TITER
PROTEINASE3 AB SER IA-ACNC: <0.2 UNITS (ref 0–0.9)